# Patient Record
Sex: FEMALE | Race: BLACK OR AFRICAN AMERICAN | NOT HISPANIC OR LATINO | URBAN - METROPOLITAN AREA
[De-identification: names, ages, dates, MRNs, and addresses within clinical notes are randomized per-mention and may not be internally consistent; named-entity substitution may affect disease eponyms.]

---

## 2023-04-25 ENCOUNTER — PATIENT MESSAGE (OUTPATIENT)
Dept: DERMATOLOGY | Facility: CLINIC | Age: 63
End: 2023-04-25
Payer: COMMERCIAL

## 2023-05-04 ENCOUNTER — OFFICE VISIT (OUTPATIENT)
Dept: DERMATOLOGY | Facility: CLINIC | Age: 63
End: 2023-05-04
Payer: COMMERCIAL

## 2023-05-04 DIAGNOSIS — L72.0 EPIDERMAL INCLUSION CYST: Primary | ICD-10-CM

## 2023-05-04 PROCEDURE — 11440 EXC FACE-MM B9+MARG 0.5 CM/<: CPT | Mod: S$GLB,,, | Performed by: STUDENT IN AN ORGANIZED HEALTH CARE EDUCATION/TRAINING PROGRAM

## 2023-05-04 PROCEDURE — 99999 PR PBB SHADOW E&M-EST. PATIENT-LVL II: CPT | Mod: PBBFAC,,, | Performed by: STUDENT IN AN ORGANIZED HEALTH CARE EDUCATION/TRAINING PROGRAM

## 2023-05-04 PROCEDURE — 88304 TISSUE EXAM BY PATHOLOGIST: CPT | Performed by: PATHOLOGY

## 2023-05-04 PROCEDURE — 99499 UNLISTED E&M SERVICE: CPT | Mod: S$GLB,,, | Performed by: STUDENT IN AN ORGANIZED HEALTH CARE EDUCATION/TRAINING PROGRAM

## 2023-05-04 PROCEDURE — 99999 PR PBB SHADOW E&M-EST. PATIENT-LVL II: ICD-10-PCS | Mod: PBBFAC,,, | Performed by: STUDENT IN AN ORGANIZED HEALTH CARE EDUCATION/TRAINING PROGRAM

## 2023-05-04 PROCEDURE — 11440 PR EXC SKIN BENIG <0.5 CM FACE,FACIAL: ICD-10-PCS | Mod: S$GLB,,, | Performed by: STUDENT IN AN ORGANIZED HEALTH CARE EDUCATION/TRAINING PROGRAM

## 2023-05-04 PROCEDURE — 1160F PR REVIEW ALL MEDS BY PRESCRIBER/CLIN PHARMACIST DOCUMENTED: ICD-10-PCS | Mod: CPTII,S$GLB,, | Performed by: STUDENT IN AN ORGANIZED HEALTH CARE EDUCATION/TRAINING PROGRAM

## 2023-05-04 PROCEDURE — 88304 TISSUE EXAM BY PATHOLOGIST: CPT | Mod: 26,,, | Performed by: PATHOLOGY

## 2023-05-04 PROCEDURE — 88304 PR  SURG PATH,LEVEL III: ICD-10-PCS | Mod: 26,,, | Performed by: PATHOLOGY

## 2023-05-04 PROCEDURE — 1159F PR MEDICATION LIST DOCUMENTED IN MEDICAL RECORD: ICD-10-PCS | Mod: CPTII,S$GLB,, | Performed by: STUDENT IN AN ORGANIZED HEALTH CARE EDUCATION/TRAINING PROGRAM

## 2023-05-04 PROCEDURE — 1160F RVW MEDS BY RX/DR IN RCRD: CPT | Mod: CPTII,S$GLB,, | Performed by: STUDENT IN AN ORGANIZED HEALTH CARE EDUCATION/TRAINING PROGRAM

## 2023-05-04 PROCEDURE — 1159F MED LIST DOCD IN RCRD: CPT | Mod: CPTII,S$GLB,, | Performed by: STUDENT IN AN ORGANIZED HEALTH CARE EDUCATION/TRAINING PROGRAM

## 2023-05-04 PROCEDURE — 99499 NO LOS: ICD-10-PCS | Mod: S$GLB,,, | Performed by: STUDENT IN AN ORGANIZED HEALTH CARE EDUCATION/TRAINING PROGRAM

## 2023-05-04 NOTE — PROGRESS NOTES
Subjective:      Patient ID:  Siobhan Stockton is a 63 y.o. female who presents for   Chief Complaint   Patient presents with    Spot     Spot - Initial  Affected locations: right cheek.  Duration: as long as she can remember.    Review of Systems   Hematologic/Lymphatic: Does not bruise/bleed easily.     Objective:   Physical Exam   Constitutional: She appears well-developed and well-nourished. No distress.   Neurological: She is alert and oriented to person, place, and time. She is not disoriented.   Psychiatric: She has a normal mood and affect.   Skin:   Areas Examined (abnormalities noted in diagram):   Head / Face Inspection Performed          Diagram Legend     Erythematous scaling macule/papule c/w actinic keratosis       Vascular papule c/w angioma      Pigmented verrucoid papule/plaque c/w seborrheic keratosis      Yellow umbilicated papule c/w sebaceous hyperplasia      Irregularly shaped tan macule c/w lentigo     1-2 mm smooth white papules consistent with Milia      Movable subcutaneous cyst with punctum c/w epidermal inclusion cyst      Subcutaneous movable cyst c/w pilar cyst      Firm pink to brown papule c/w dermatofibroma      Pedunculated fleshy papule(s) c/w skin tag(s)      Evenly pigmented macule c/w junctional nevus     Mildly variegated pigmented, slightly irregular-bordered macule c/w mildly atypical nevus      Flesh colored to evenly pigmented papule c/w intradermal nevus       Pink pearly papule/plaque c/w basal cell carcinoma      Erythematous hyperkeratotic cursted plaque c/w SCC      Surgical scar with no sign of skin cancer recurrence      Open and closed comedones      Inflammatory papules and pustules      Verrucoid papule consistent consistent with wart     Erythematous eczematous patches and plaques     Dystrophic onycholytic nail with subungual debris c/w onychomycosis     Umbilicated papule    Erythematous-base heme-crusted tan verrucoid plaque consistent with inflamed  seborrheic keratosis     Erythematous Silvery Scaling Plaque c/w Psoriasis     See annotation        Assessment / Plan:      Pathology Orders:       Normal Orders This Visit    Specimen to Pathology, Dermatology     Questions:    Procedure Type: Dermatology and skin neoplasms    Number of Specimens: 1    ------------------------: -------------------------    Spec 1 Procedure: Biopsy    Spec 1 Clinical Impression: punch excision of EIC    Spec 1 Source: right temple    Release to patient: Immediate          Epidermal inclusion cyst  -     Specimen to Pathology, Dermatology    Punch excision procedure note:  Punch excision performed after verbal consent obtained. Discussed RBSE including scar, infection, bleeding, recurrence. Area marked and prepped with alcohol. Approximately 1cc of 1% lidocaine with epinephrine injected. 4 mm disposable punch used to remove lesion. Hemostasis obtained and biopsy site closed with 3 Prolene sutures. Wound care instructions reviewed with patient and handout given.       Follow up in about 1 week (around 5/11/2023) for s/r.

## 2023-05-04 NOTE — PATIENT INSTRUCTIONS

## 2023-05-11 LAB
FINAL PATHOLOGIC DIAGNOSIS: NORMAL
GROSS: NORMAL
Lab: NORMAL
MICROSCOPIC EXAM: NORMAL

## 2023-05-18 ENCOUNTER — TELEPHONE (OUTPATIENT)
Dept: DERMATOLOGY | Facility: CLINIC | Age: 63
End: 2023-05-18
Payer: COMMERCIAL

## 2023-05-18 ENCOUNTER — CLINICAL SUPPORT (OUTPATIENT)
Dept: DERMATOLOGY | Facility: CLINIC | Age: 63
End: 2023-05-18
Payer: COMMERCIAL

## 2023-05-18 DIAGNOSIS — Z48.02 ENCOUNTER FOR REMOVAL OF SUTURES: Primary | ICD-10-CM

## 2023-05-18 PROCEDURE — 99024 POSTOP FOLLOW-UP VISIT: CPT | Mod: S$GLB,,, | Performed by: STUDENT IN AN ORGANIZED HEALTH CARE EDUCATION/TRAINING PROGRAM

## 2023-05-18 PROCEDURE — 99024 PR POST-OP FOLLOW-UP VISIT: ICD-10-PCS | Mod: S$GLB,,, | Performed by: STUDENT IN AN ORGANIZED HEALTH CARE EDUCATION/TRAINING PROGRAM

## 2023-05-18 NOTE — TELEPHONE ENCOUNTER
----- Message from Rupali Canales MA sent at 5/18/2023  8:28 AM CDT -----  Regarding: FW: S/R 130p appt  Contact: [phone]    ----- Message -----  From: Tanvi Salgado CMA  Sent: 5/18/2023   8:26 AM CDT  To: [facility] Staff, #  Subject: S/R 130p appt                                    Hi,    Pt is at the hospital now for an appt with her  and would like to know if she can come this morning to have S/R?    Please call pt to advise.    Thank you

## 2024-10-15 ENCOUNTER — TELEPHONE (OUTPATIENT)
Dept: CARDIOLOGY | Facility: CLINIC | Age: 64
End: 2024-10-15
Payer: COMMERCIAL

## 2024-10-15 NOTE — TELEPHONE ENCOUNTER
Patient called and is scheduled as a new patient to see Dr. Cobb   I personally performed the service described in the documentation  recorded by the scribe in my presence, and it accurately and completely records my words and action.

## 2024-10-21 ENCOUNTER — TELEPHONE (OUTPATIENT)
Dept: CARDIOLOGY | Facility: CLINIC | Age: 64
End: 2024-10-21
Payer: COMMERCIAL

## 2024-11-20 ENCOUNTER — TELEPHONE (OUTPATIENT)
Dept: CARDIOLOGY | Facility: CLINIC | Age: 64
End: 2024-11-20

## 2024-11-20 ENCOUNTER — OFFICE VISIT (OUTPATIENT)
Dept: CARDIOLOGY | Facility: CLINIC | Age: 64
End: 2024-11-20
Payer: COMMERCIAL

## 2024-11-20 VITALS
BODY MASS INDEX: 25.47 KG/M2 | SYSTOLIC BLOOD PRESSURE: 118 MMHG | OXYGEN SATURATION: 99 % | WEIGHT: 143.75 LBS | HEART RATE: 67 BPM | HEIGHT: 63 IN | DIASTOLIC BLOOD PRESSURE: 72 MMHG | RESPIRATION RATE: 18 BRPM

## 2024-11-20 DIAGNOSIS — R06.09 DOE (DYSPNEA ON EXERTION): Primary | ICD-10-CM

## 2024-11-20 DIAGNOSIS — Z82.49 FH: CAD (CORONARY ARTERY DISEASE): ICD-10-CM

## 2024-11-20 DIAGNOSIS — R06.02 SOB (SHORTNESS OF BREATH): ICD-10-CM

## 2024-11-20 DIAGNOSIS — R07.2 PRECORDIAL PAIN: ICD-10-CM

## 2024-11-20 DIAGNOSIS — Z13.6 ENCOUNTER FOR SCREENING FOR CARDIOVASCULAR DISORDERS: ICD-10-CM

## 2024-11-20 PROCEDURE — 1159F MED LIST DOCD IN RCRD: CPT | Mod: CPTII,S$GLB,, | Performed by: INTERNAL MEDICINE

## 2024-11-20 PROCEDURE — 3288F FALL RISK ASSESSMENT DOCD: CPT | Mod: CPTII,S$GLB,, | Performed by: INTERNAL MEDICINE

## 2024-11-20 PROCEDURE — 3074F SYST BP LT 130 MM HG: CPT | Mod: CPTII,S$GLB,, | Performed by: INTERNAL MEDICINE

## 2024-11-20 PROCEDURE — 99204 OFFICE O/P NEW MOD 45 MIN: CPT | Mod: S$GLB,,, | Performed by: INTERNAL MEDICINE

## 2024-11-20 PROCEDURE — 3078F DIAST BP <80 MM HG: CPT | Mod: CPTII,S$GLB,, | Performed by: INTERNAL MEDICINE

## 2024-11-20 PROCEDURE — 3008F BODY MASS INDEX DOCD: CPT | Mod: CPTII,S$GLB,, | Performed by: INTERNAL MEDICINE

## 2024-11-20 PROCEDURE — 1160F RVW MEDS BY RX/DR IN RCRD: CPT | Mod: CPTII,S$GLB,, | Performed by: INTERNAL MEDICINE

## 2024-11-20 PROCEDURE — 1101F PT FALLS ASSESS-DOCD LE1/YR: CPT | Mod: CPTII,S$GLB,, | Performed by: INTERNAL MEDICINE

## 2024-11-20 PROCEDURE — 4010F ACE/ARB THERAPY RXD/TAKEN: CPT | Mod: CPTII,S$GLB,, | Performed by: INTERNAL MEDICINE

## 2024-11-20 PROCEDURE — 99999 PR PBB SHADOW E&M-EST. PATIENT-LVL IV: CPT | Mod: PBBFAC,,, | Performed by: INTERNAL MEDICINE

## 2024-11-20 RX ORDER — IPRATROPIUM BROMIDE 17 UG/1
2 AEROSOL, METERED RESPIRATORY (INHALATION) EVERY 6 HOURS PRN
COMMUNITY

## 2024-11-20 RX ORDER — TIZANIDINE 4 MG/1
2 TABLET ORAL EVERY 8 HOURS
COMMUNITY
Start: 2024-01-08

## 2024-11-20 RX ORDER — FLUTICASONE PROPIONATE AND SALMETEROL 250; 50 UG/1; UG/1
1 POWDER RESPIRATORY (INHALATION) 2 TIMES DAILY
COMMUNITY
Start: 2024-03-20

## 2024-11-20 RX ORDER — ALBUTEROL SULFATE 90 UG/1
2 INHALANT RESPIRATORY (INHALATION) EVERY 6 HOURS PRN
COMMUNITY
Start: 2024-04-08 | End: 2025-04-08

## 2024-11-20 RX ORDER — LOSARTAN POTASSIUM 100 MG/1
1 TABLET ORAL DAILY
COMMUNITY
Start: 2024-10-14

## 2024-11-20 RX ORDER — CHLORHEXIDINE GLUCONATE ORAL RINSE 1.2 MG/ML
15 SOLUTION DENTAL 2 TIMES DAILY
COMMUNITY
Start: 2024-10-22

## 2024-11-20 RX ORDER — ALBUTEROL SULFATE 0.83 MG/ML
2.5 SOLUTION RESPIRATORY (INHALATION) EVERY 6 HOURS PRN
COMMUNITY
Start: 2024-01-08

## 2024-11-20 NOTE — LETTER
November 20, 2024        Natalee San MD  [address]  [address]  [facility]  [place] [address]             [facility]  [address]  [place] [address]  Phone: [phone]   Patient: Siobhan Stockton   MR Number: [id number]   YOB: 1960   Date of Visit: 11/20/2024       Dear Dr. San:    Thank you for referring Siobhan Stockton to me for evaluation. Attached you will find relevant portions of my assessment and plan of care.    If you have questions, please do not hesitate to call me. I look forward to following Siobhan Stockton along with you.    Sincerely,      Terrance Cobb MD            CC  No Recipients    Enclosure

## 2024-11-20 NOTE — PROGRESS NOTES
CARDIOVASCULAR CONSULTATION    REASON FOR CONSULT:   Siobhan Stockton is a 64 y.o. female who presents for CP/BROWN.    PCP/req: Mena  HISTORY OF PRESENT ILLNESS:   The patient is a pleasant 64-year-old woman who presents to the request of her primary care physician for evaluation of chest pain dyspnea on exertion.  I offered to see her .  She describes subacute onset of dyspnea on exertion some associated chest tightness.  She states that this will be exertionally related, but can also occur at rest.  She denies any palpitations or syncope.  There has been no PND, orthopnea, or lower extremity edema.  She denies melena, hematuria, or claudication symptoms.      Family history is notable for father who  of a massive heart attack in his 50s and a mother who  of stroke in her 60s.      The patient denies tobacco use, alcohol excess, or illicit drug use.  She is accompanied by her  to today's visit.    CARDIOVASCULAR HISTORY:   none    PAST MEDICAL HISTORY:     Past Medical History:   Diagnosis Date    Asthma        PAST SURGICAL HISTORY:   History reviewed. No pertinent surgical history.    ALLERGIES AND MEDICATION:     Review of patient's allergies indicates:   Allergen Reactions    Sulfa (sulfonamide antibiotics) Rash        Medication List            Accurate as of 2024 10:15 AM. If you have any questions, ask your nurse or doctor.                CONTINUE taking these medications      * albuterol 2.5 mg /3 mL (0.083 %) nebulizer solution  Commonly known as: PROVENTIL     * albuterol 90 mcg/actuation inhaler  Commonly known as: PROVENTIL/VENTOLIN HFA     ATROVENT HFA 17 mcg/actuation inhaler  Generic drug: ipratropium     BLACK COHOSH ORAL     chlorhexidine 0.12 % solution  Commonly known as: PERIDEX     fluticasone-salmeterol 250-50 mcg/dose 250-50 mcg/dose diskus inhaler  Commonly known as: ADVAIR     losartan 100 MG tablet  Commonly known as: COZAAR     tiZANidine 4 MG  tablet  Commonly known as: ZANAFLEX           * This list has 2 medication(s) that are the same as other medications prescribed for you. Read the directions carefully, and ask your doctor or other care provider to review them with you.                  SOCIAL HISTORY:     Social History     Socioeconomic History    Marital status:    Tobacco Use    Smoking status: Never     Social Drivers of Health     Financial Resource Strain: Medium Risk (10/5/2022)    Received from [facility]    Overall Financial Resource Strain (CARDIA)     Difficulty of Paying Living Expenses: Somewhat hard   Food Insecurity: Food Insecurity Present (10/5/2022)    Received from [facility]    Hunger Vital Sign     Worried About Running Out of Food in the Last Year: Sometimes true     Ran Out of Food in the Last Year: Sometimes true   Transportation Needs: No Transportation Needs (10/5/2022)    Received from [facility]    PRAPARE - Transportation     Lack of Transportation (Medical): No     Lack of Transportation (Non-Medical): No   Physical Activity: Insufficiently Active (10/5/2022)    Received from [facility]    Exercise Vital Sign     Days of Exercise per Week: 1 day     Minutes of Exercise per Session: 20 min   Stress: Stress Concern Present (10/5/2022)    Received from [facility]    [employer] - Occupational Stress Questionnaire     Feeling of Stress : To some extent   Housing Stability: Low Risk  (10/5/2022)    Received from [facility]    Housing Stability Vital Sign     Unable to Pay for Housing in the Last Year: No     Number of Places Lived in the Last Year: 1     Unstable Housing in the Last Year: No       FAMILY HISTORY:     Family History   Problem Relation Name Age of Onset    Stroke Mother      Heart attack Father         REVIEW OF SYSTEMS:   Review of Systems   Constitutional:  Negative for chills, diaphoresis and fever.   HENT:  Negative for nosebleeds.    Eyes:  Negative for blurred  "vision, double vision and photophobia.   Respiratory:  Positive for shortness of breath. Negative for hemoptysis and wheezing.    Cardiovascular:  Positive for chest pain. Negative for palpitations, orthopnea, claudication, leg swelling and PND.   Gastrointestinal:  Negative for abdominal pain, blood in stool, heartburn, melena, nausea and vomiting.   Genitourinary:  Negative for flank pain and hematuria.   Musculoskeletal:  Negative for falls, myalgias and neck pain.   Skin:  Negative for rash.   Neurological:  Negative for dizziness, seizures, loss of consciousness, weakness and headaches.   Endo/Heme/Allergies:  Negative for polydipsia. Does not bruise/bleed easily.   Psychiatric/Behavioral:  Negative for depression and memory loss. The patient is not nervous/anxious.        PHYSICAL EXAM:     Vitals:    11/20/24 1001   BP: 118/72   Pulse: 67   Resp: 18    Body mass index is 25.46 kg/m².  Weight: 65.2 kg (143 lb 11.8 oz)   Height: 5' 3" (160 cm)     Physical Exam  Vitals reviewed.   Constitutional:       General: She is not in acute distress.     Appearance: Normal appearance. She is well-developed and normal weight. She is not ill-appearing, toxic-appearing or diaphoretic.   HENT:      Head: Normocephalic and atraumatic.   Eyes:      General: No scleral icterus.     Extraocular Movements: Extraocular movements intact.      Conjunctiva/sclera: Conjunctivae normal.      Pupils: Pupils are equal, round, and reactive to light.   Neck:      Thyroid: No thyromegaly.      Vascular: Normal carotid pulses. No carotid bruit or JVD.      Trachea: Trachea normal.   Cardiovascular:      Rate and Rhythm: Normal rate and regular rhythm.      Pulses:           Carotid pulses are 2+ on the right side and 2+ on the left side.     Heart sounds: S1 normal and S2 normal. No murmur heard.     No friction rub. No gallop.   Pulmonary:      Effort: Pulmonary effort is normal. No respiratory distress.      Breath sounds: Normal breath " sounds. No stridor. No wheezing, rhonchi or rales.   Chest:      Chest wall: No tenderness.   Abdominal:      General: There is no distension.      Palpations: Abdomen is soft.   Musculoskeletal:         General: No swelling or tenderness. Normal range of motion.      Cervical back: Normal range of motion and neck supple. No edema or rigidity.      Right lower leg: No edema.      Left lower leg: No edema.   Feet:      Right foot:      Skin integrity: No ulcer.      Left foot:      Skin integrity: No ulcer.   Skin:     General: Skin is warm and dry.      Coloration: Skin is not jaundiced.   Neurological:      General: No focal deficit present.      Mental Status: She is alert and oriented to person, place, and time.      Cranial Nerves: No cranial nerve deficit.   Psychiatric:         Mood and Affect: Mood normal.         Speech: Speech normal.         Behavior: Behavior normal. Behavior is cooperative.         DATA:   EKG: (personally reviewed tracing(s))  11/20/24 SR 62    Laboratory:  CBC:        CHEMISTRIES:  Recent Labs   Lab 09/27/23  1046   Glucose 81   Sodium 141   Potassium 4.2   Blood Urea Nitrogen 8   Creatinine 0.65   eGFR 98   Calcium 9.6       CARDIAC BIOMARKERS:        COAGS:        LIPIDS/LFTS:  Recent Labs   Lab 05/09/22  0926 09/27/23  1046   Cholesterol 186  --    Triglycerides 149  --    HDL 57  --    LDL Calculated 105  --    AST  --  20   ALT  --  13     Lipid panel 09/27/2023   Total cholesterol 183   Triglycerides 106   HDL 63       The 10-year ASCVD risk score ([place] HUNG, et al., 2019) is: 6.1%    Values used to calculate the score:      Age: 64 years      Sex: Female      Is Non- : Yes      Diabetic: No      Tobacco smoker: No      Systolic Blood Pressure: 118 mmHg      Is BP treated: No      HDL Cholesterol: 57 mg/dL      Total Cholesterol: 186 mg/dL      Cardiovascular Testing:  Ordered  ETT  Echo    ASSESSMENT:   # CP/SOB, exertional  # FH CAD  # borderline  ASCVD risk.  # hx asthma    PLAN:   Cont med rx  ETT  Echo  Coronary calcium score  RTC 1 month (Dec 2024).  Consider statin rx pending results.      Terrance Cobb MD, FACC

## 2024-11-20 NOTE — TELEPHONE ENCOUNTER
----- Message from Tech Khalida sent at 11/20/2024 10:50 AM CST -----  Regarding: Patient call back  .Type: Patient Call Back    Who called:self     What is the request in detail:caller states she received a message from the office to give a call back     Can the clinic reply by [facility]?no     Would the patient rather a call back or a response via [facility]? Call     Best call back [phone]      Additional Information:

## 2024-11-21 ENCOUNTER — HOSPITAL ENCOUNTER (OUTPATIENT)
Dept: CARDIOLOGY | Facility: HOSPITAL | Age: 64
Discharge: HOME | End: 2024-11-21
Attending: INTERNAL MEDICINE
Payer: MEDICARE

## 2024-11-21 VITALS
BODY MASS INDEX: 25.34 KG/M2 | WEIGHT: 143 LBS | WEIGHT: 143 LBS | HEIGHT: 63 IN | HEIGHT: 63 IN | BODY MASS INDEX: 25.34 KG/M2

## 2024-11-21 DIAGNOSIS — R06.09 DOE (DYSPNEA ON EXERTION): ICD-10-CM

## 2024-11-21 DIAGNOSIS — Z82.49 FH: CAD (CORONARY ARTERY DISEASE): ICD-10-CM

## 2024-11-21 DIAGNOSIS — R06.02 SOB (SHORTNESS OF BREATH): ICD-10-CM

## 2024-11-21 DIAGNOSIS — R07.2 PRECORDIAL PAIN: ICD-10-CM

## 2024-11-21 LAB
AORTIC ROOT ANNULUS: 2.85 CM
AORTIC VALVE CUSP SEPERATION: 1.97 CM
ASCENDING AORTA: 2.65 CM
AV INDEX (PROSTH): 0.91
AV MEAN GRADIENT: 3.2 MMHG
AV PEAK GRADIENT: 4.8 MMHG
AV VALVE AREA BY VELOCITY RATIO: 2.9 CM²
AV VALVE AREA: 2.9 CM²
AV VELOCITY RATIO: 0.91
BSA FOR ECHO PROCEDURE: 1.7 M2
CV ECHO LV RWT: 0.8 CM
DOP CALC AO PEAK VEL: 1.1 M/S
DOP CALC AO VTI: 27.7 CM
DOP CALC LVOT AREA: 3.1 CM2
DOP CALC LVOT DIAMETER: 2 CM
DOP CALC LVOT PEAK VEL: 1 M/S
DOP CALC LVOT STROKE VOLUME: 79.1 CM3
DOP CALCLVOT PEAK VEL VTI: 25.2 CM
E WAVE DECELERATION TIME: 172.87 MSEC
E/A RATIO: 1.05
E/E' RATIO: 12.71 M/S
ECHO LV POSTERIOR WALL: 1.2 CM (ref 0.6–1.1)
FRACTIONAL SHORTENING: 33.3 % (ref 28–44)
INTERVENTRICULAR SEPTUM: 1.2 CM (ref 0.6–1.1)
IVC DIAMETER: 1.61 CM
IVRT: 148.43 MSEC
LA MAJOR: 5.1 CM
LA MINOR: 3.47 CM
LA WIDTH: 3.5 CM
LEFT ATRIUM SIZE: 3.33 CM
LEFT ATRIUM VOLUME INDEX: 24.4 ML/M2
LEFT ATRIUM VOLUME: 40.91 CM3
LEFT INTERNAL DIMENSION IN SYSTOLE: 2 CM (ref 2.1–4)
LEFT VENTRICLE DIASTOLIC VOLUME INDEX: 21.57 ML/M2
LEFT VENTRICLE DIASTOLIC VOLUME: 36.24 ML
LEFT VENTRICLE MASS INDEX: 65 G/M2
LEFT VENTRICLE SYSTOLIC VOLUME INDEX: 7.8 ML/M2
LEFT VENTRICLE SYSTOLIC VOLUME: 13.12 ML
LEFT VENTRICULAR INTERNAL DIMENSION IN DIASTOLE: 3 CM (ref 3.5–6)
LEFT VENTRICULAR MASS: 109.1 G
LV LATERAL E/E' RATIO: 12.71 M/S
LV SEPTAL E/E' RATIO: 12.71 M/S
LVED V (TEICH): 36.24 ML
LVES V (TEICH): 13.12 ML
LVOT MG: 2.35 MMHG
LVOT MV: 0.74 CM/S
MV PEAK A VEL: 0.85 M/S
MV PEAK E VEL: 0.89 M/S
MV STENOSIS PRESSURE HALF TIME: 50.13 MS
MV VALVE AREA P 1/2 METHOD: 4.39 CM2
PISA TR MAX VEL: 2.6 M/S
PULM VEIN S/D RATIO: 1.78
PV PEAK D VEL: 0.4 M/S
PV PEAK GRADIENT: 1 MMHG
PV PEAK S VEL: 0.71 M/S
PV PEAK VELOCITY: 0.56 M/S
RA MAJOR: 4.63 CM
RA PRESSURE ESTIMATED: 8 MMHG
RA WIDTH: 2.89 CM
RIGHT VENTRICULAR END-DIASTOLIC DIMENSION: 3.83 CM
RV TB RVSP: 11 MMHG
SINUS: 3.03 CM
STJ: 2.45 CM
TDI LATERAL: 0.07 M/S
TDI SEPTAL: 0.07 M/S
TDI: 0.07 M/S
TR MAX PG: 27 MMHG
TRICUSPID ANNULAR PLANE SYSTOLIC EXCURSION: 2.38 CM
TV REST PULMONARY ARTERY PRESSURE: 35 MMHG
Z-SCORE OF LEFT VENTRICULAR DIMENSION IN END DIASTOLE: -4.46
Z-SCORE OF LEFT VENTRICULAR DIMENSION IN END SYSTOLE: -2.95

## 2024-11-21 PROCEDURE — 93018 CV STRESS TEST I&R ONLY: CPT | Mod: ,,, | Performed by: INTERNAL MEDICINE

## 2024-11-21 PROCEDURE — 93306 TTE W/DOPPLER COMPLETE: CPT

## 2024-11-21 PROCEDURE — 93017 CV STRESS TEST TRACING ONLY: CPT

## 2024-11-21 PROCEDURE — 93016 CV STRESS TEST SUPVJ ONLY: CPT | Mod: ,,, | Performed by: INTERNAL MEDICINE

## 2024-11-21 PROCEDURE — 93306 TTE W/DOPPLER COMPLETE: CPT | Mod: 26,,, | Performed by: INTERNAL MEDICINE

## 2024-11-22 LAB
CV STRESS BASE HR: 74 BPM
DIASTOLIC BLOOD PRESSURE: 108 MMHG
OHS CV CPX 1 MINUTE RECOVERY HEART RATE: 114 BPM
OHS CV CPX 85 PERCENT MAX PREDICTED HEART RATE MALE: 132
OHS CV CPX ESTIMATED METS: 8.1
OHS CV CPX MAX PREDICTED HEART RATE: 155
OHS CV CPX PATIENT IS FEMALE: 1
OHS CV CPX PATIENT IS MALE: 0
OHS CV CPX PEAK DIASTOLIC BLOOD PRESSURE: 108 MMHG
OHS CV CPX PEAK HEAR RATE: 131 BPM
OHS CV CPX PEAK RATE PRESSURE PRODUCT: NORMAL
OHS CV CPX PEAK SYSTOLIC BLOOD PRESSURE: 206 MMHG
OHS CV CPX PERCENT MAX PREDICTED HEART RATE ACHIEVED: 88
OHS CV CPX RATE PRESSURE PRODUCT PRESENTING: NORMAL
STRESS ECHO POST EXERCISE DUR MIN: 6 MINUTES
STRESS ECHO POST EXERCISE DUR SEC: 44 SECONDS
SYSTOLIC BLOOD PRESSURE: 144 MMHG

## 2024-11-23 LAB
OHS QRS DURATION: 80 MS
OHS QTC CALCULATION: 430 MS

## 2024-11-25 ENCOUNTER — HOSPITAL ENCOUNTER (OUTPATIENT)
Dept: RADIOLOGY | Facility: HOSPITAL | Age: 64
Discharge: HOME | End: 2024-11-25
Attending: INTERNAL MEDICINE
Payer: MEDICARE

## 2024-11-25 DIAGNOSIS — Z13.6 ENCOUNTER FOR SCREENING FOR CARDIOVASCULAR DISORDERS: ICD-10-CM

## 2024-11-25 PROCEDURE — 75571 CT HRT W/O DYE W/CA TEST: CPT | Mod: TC

## 2024-11-25 PROCEDURE — 75571 CT HRT W/O DYE W/CA TEST: CPT | Mod: 26,,, | Performed by: RADIOLOGY

## 2024-12-12 ENCOUNTER — OFFICE VISIT (OUTPATIENT)
Dept: CARDIOLOGY | Facility: CLINIC | Age: 64
End: 2024-12-12
Payer: MEDICARE

## 2024-12-12 VITALS
OXYGEN SATURATION: 98 % | SYSTOLIC BLOOD PRESSURE: 160 MMHG | WEIGHT: 142.63 LBS | DIASTOLIC BLOOD PRESSURE: 90 MMHG | HEART RATE: 85 BPM | BODY MASS INDEX: 25.27 KG/M2 | RESPIRATION RATE: 18 BRPM | HEIGHT: 63 IN

## 2024-12-12 DIAGNOSIS — R06.09 DOE (DYSPNEA ON EXERTION): Primary | ICD-10-CM

## 2024-12-12 DIAGNOSIS — R07.2 PRECORDIAL PAIN: ICD-10-CM

## 2024-12-12 DIAGNOSIS — I10 ESSENTIAL HYPERTENSION: ICD-10-CM

## 2024-12-12 DIAGNOSIS — Z82.49 FH: CAD (CORONARY ARTERY DISEASE): ICD-10-CM

## 2024-12-12 PROCEDURE — 99213 OFFICE O/P EST LOW 20 MIN: CPT | Mod: S$PBB,,, | Performed by: INTERNAL MEDICINE

## 2024-12-12 PROCEDURE — 99999 PR PBB SHADOW E&M-EST. PATIENT-LVL IV: CPT | Mod: PBBFAC,,, | Performed by: INTERNAL MEDICINE

## 2024-12-12 PROCEDURE — 99214 OFFICE O/P EST MOD 30 MIN: CPT | Mod: PBBFAC | Performed by: INTERNAL MEDICINE

## 2024-12-12 NOTE — PROGRESS NOTES
CARDIOVASCULAR PROGRESS NOTE    REASON FOR CONSULT:   Siobhan Stockton is a 64 y.o. female who presents for f/u CP/BROWN, testing.    PCP: Mena  HISTORY OF PRESENT ILLNESS:   The patient returns for follow up of her testing.  She describes some dyspnea during her treadmill stress test but no angina.  She has had no further anginal symptoms but does describe occasional shortness breath which he relates to her asthma.  There has been no palpitations or syncope.  She denies PND, orthopnea, melena, hematuria, or claudication symptoms.  Her echocardiogram and treadmill stress test were negative.  Her coronary calcium score was 0.    Her blood pressure is out of range here today.  I have recommended she follow up with the primary care physician for blood pressure check and monitor blood pressure at home (she has a blood pressure cuff), and seek care if it is persistently greater than 140/90.    CARDIOVASCULAR HISTORY:   none    PAST MEDICAL HISTORY:     Past Medical History:   Diagnosis Date    Asthma        PAST SURGICAL HISTORY:   History reviewed. No pertinent surgical history.    ALLERGIES AND MEDICATION:     Review of patient's allergies indicates:   Allergen Reactions    Sulfa (sulfonamide antibiotics) Rash        Medication List            Accurate as of December 12, 2024 10:51 AM. If you have any questions, ask your nurse or doctor.                CONTINUE taking these medications      * albuterol 2.5 mg /3 mL (0.083 %) nebulizer solution  Commonly known as: PROVENTIL     * albuterol 90 mcg/actuation inhaler  Commonly known as: PROVENTIL/VENTOLIN HFA     ATROVENT HFA 17 mcg/actuation inhaler  Generic drug: ipratropium     BLACK COHOSH ORAL     fluticasone-salmeterol 250-50 mcg/dose 250-50 mcg/dose diskus inhaler  Commonly known as: ADVAIR     losartan 100 MG tablet  Commonly known as: COZAAR     tiZANidine 4 MG tablet  Commonly known as: ZANAFLEX           * This list has 2 medication(s) that are the same as  other medications prescribed for you. Read the directions carefully, and ask your doctor or other care provider to review them with you.                STOP taking these medications      chlorhexidine 0.12 % solution  Commonly known as: PERIDEX  Stopped by: Terrance Cobb MD              SOCIAL HISTORY:     Social History     Socioeconomic History    Marital status:    Tobacco Use    Smoking status: Never     Social Drivers of Health     Financial Resource Strain: Medium Risk (10/5/2022)    Received from [facility]    Overall Financial Resource Strain (CARDIA)     Difficulty of Paying Living Expenses: Somewhat hard   Food Insecurity: Food Insecurity Present (10/5/2022)    Received from [facility]    Hunger Vital Sign     Worried About Running Out of Food in the Last Year: Sometimes true     Ran Out of Food in the Last Year: Sometimes true   Transportation Needs: No Transportation Needs (10/5/2022)    Received from [facility]    PRAPARE - Transportation     Lack of Transportation (Medical): No     Lack of Transportation (Non-Medical): No   Physical Activity: Insufficiently Active (10/5/2022)    Received from [facility]    Exercise Vital Sign     Days of Exercise per Week: 1 day     Minutes of Exercise per Session: 20 min   Stress: Stress Concern Present (10/5/2022)    Received from [facility]    [employer] - Occupational Stress Questionnaire     Feeling of Stress : To some extent   Housing Stability: Low Risk  (10/5/2022)    Received from [facility]    Housing Stability Vital Sign     Unable to Pay for Housing in the Last Year: No     Number of Places Lived in the Last Year: 1     Unstable Housing in the Last Year: No       FAMILY HISTORY:     Family History   Problem Relation Name Age of Onset    Stroke Mother      Heart attack Father         REVIEW OF SYSTEMS:   Review of Systems   Constitutional:  Negative for chills, diaphoresis and fever.   HENT:  Negative for  "nosebleeds.    Eyes:  Negative for blurred vision, double vision and photophobia.   Respiratory:  Positive for shortness of breath. Negative for hemoptysis and wheezing.    Cardiovascular:  Negative for chest pain, palpitations, orthopnea, claudication, leg swelling and PND.   Gastrointestinal:  Negative for abdominal pain, blood in stool, heartburn, melena, nausea and vomiting.   Genitourinary:  Negative for flank pain and hematuria.   Musculoskeletal:  Negative for falls, myalgias and neck pain.   Skin:  Negative for rash.   Neurological:  Negative for dizziness, seizures, loss of consciousness, weakness and headaches.   Endo/Heme/Allergies:  Negative for polydipsia. Does not bruise/bleed easily.   Psychiatric/Behavioral:  Negative for depression and memory loss. The patient is not nervous/anxious.        PHYSICAL EXAM:     Vitals:    12/12/24 1034   BP: (!) 160/90   Pulse: 85   Resp: 18    Body mass index is 25.27 kg/m².  Weight: 64.7 kg (142 lb 10.2 oz)   Height: 5' 3" (160 cm)     Physical Exam  Vitals reviewed.   Constitutional:       General: She is not in acute distress.     Appearance: Normal appearance. She is well-developed and normal weight. She is not ill-appearing, toxic-appearing or diaphoretic.   HENT:      Head: Normocephalic and atraumatic.   Eyes:      General: No scleral icterus.     Extraocular Movements: Extraocular movements intact.      Conjunctiva/sclera: Conjunctivae normal.      Pupils: Pupils are equal, round, and reactive to light.   Neck:      Thyroid: No thyromegaly.      Vascular: Normal carotid pulses. No carotid bruit or JVD.      Trachea: Trachea normal.   Cardiovascular:      Rate and Rhythm: Normal rate and regular rhythm.      Pulses:           Carotid pulses are 2+ on the right side and 2+ on the left side.     Heart sounds: S1 normal and S2 normal. No murmur heard.     No friction rub. No gallop.   Pulmonary:      Effort: Pulmonary effort is normal. No respiratory distress.    "   Breath sounds: Normal breath sounds. No stridor. No wheezing, rhonchi or rales.   Chest:      Chest wall: No tenderness.   Abdominal:      General: There is no distension.      Palpations: Abdomen is soft.   Musculoskeletal:         General: No swelling or tenderness. Normal range of motion.      Cervical back: Normal range of motion and neck supple. No edema or rigidity.      Right lower leg: No edema.      Left lower leg: No edema.   Feet:      Right foot:      Skin integrity: No ulcer.      Left foot:      Skin integrity: No ulcer.   Skin:     General: Skin is warm and dry.      Coloration: Skin is not jaundiced.   Neurological:      General: No focal deficit present.      Mental Status: She is alert and oriented to person, place, and time.      Cranial Nerves: No cranial nerve deficit.   Psychiatric:         Mood and Affect: Mood normal.         Speech: Speech normal.         Behavior: Behavior normal. Behavior is cooperative.         DATA:   EKG: (personally reviewed tracing(s))  11/20/24 SR 62    Laboratory:  CBC:        CHEMISTRIES:  Recent Labs   Lab 09/27/23  1046   Glucose 81   Sodium 141   Potassium 4.2   Blood Urea Nitrogen 8   Creatinine 0.65   eGFR 98   Calcium 9.6       CARDIAC BIOMARKERS:        COAGS:        LIPIDS/LFTS:  Recent Labs   Lab 05/09/22  0926 09/27/23  1046   Cholesterol 186  --    Triglycerides 149  --    HDL 57  --    LDL Calculated 105  --    AST  --  20   ALT  --  13     Lipid panel 09/27/2023   Total cholesterol 183   Triglycerides 106   HDL 63           Cardiovascular Testing:  Cor Ca++ score 11/25/24  Agatston calcium score equals 0, which translates to the 0-25th percentile for coronary calcium in this disease 64-year-old woman.     ETT 11/21/24    The patient exercised for 6 minutes 44 seconds on a Reza protocol, corresponding to a functional capacity of 8.1METS, achieving a peak heart rate of 131 bpm, which is 88% of the age predicted maximum heart rate. The patient  experienced no angina during the test. Their exercise capacity was average.    The ECG portion of the study is negative for ischemia.    The patient reported no chest pain during the stress test.    There were no arrhythmias during stress.    The exercise capacity was average.    Echo 11/21/24    Left Ventricle: The left ventricle is normal in size. Mildly increased wall thickness. There is mild concentric hypertrophy. There is normal systolic function with a visually estimated ejection fraction of 60 - 65%. Grade II diastolic dysfunction.    Right Ventricle: Normal right ventricular cavity size. Systolic function is normal.    Pulmonic Valve: There is mild to moderate regurgitation.    Pulmonary Artery: The estimated pulmonary artery systolic pressure is 35 mmHg.    ASSESSMENT:   # CP/SOB, exertional.  ETT/echo 11/2024 neg.  Cor Ca++ score 0.  # FH CAD  # HTN, uncontrolled in office.  # hx asthma    PLAN:   Cont med rx  No specific cardiac rx  Monitor blood pressure, and follow up with PCP for BP check in 2 weeks.   RTC prn      Terrance Cobb MD, FACC

## 2024-12-12 NOTE — LETTER
December 12, 2024        Natalee San MD  [address] [facility]  [address]  [facility]  [place] [address]             [facility]  [address]  [address]  [place] [address]  Phone: [phone]   Patient: Siobhan Stockton   MR Number: [id number]   YOB: 1960   Date of Visit: 12/12/2024       Dear Dr. San:    Thank you for referring Siobhan Stockton to me for evaluation. Attached you will find relevant portions of my assessment and plan of care.    If you have questions, please do not hesitate to call me. I look forward to following Siobhan Stockton along with you.    Sincerely,      Terrance Cobb MD            CC  No Recipients    Enclosure